# Patient Record
Sex: FEMALE | Race: WHITE | NOT HISPANIC OR LATINO | Employment: FULL TIME | URBAN - METROPOLITAN AREA
[De-identification: names, ages, dates, MRNs, and addresses within clinical notes are randomized per-mention and may not be internally consistent; named-entity substitution may affect disease eponyms.]

---

## 2017-05-30 ENCOUNTER — ALLSCRIPTS OFFICE VISIT (OUTPATIENT)
Dept: OTHER | Facility: OTHER | Age: 42
End: 2017-05-30

## 2017-12-01 ENCOUNTER — HOSPITAL ENCOUNTER (EMERGENCY)
Facility: HOSPITAL | Age: 42
Discharge: HOME | End: 2017-12-01
Attending: EMERGENCY MEDICINE | Admitting: EMERGENCY MEDICINE
Payer: COMMERCIAL

## 2017-12-01 VITALS
SYSTOLIC BLOOD PRESSURE: 132 MMHG | WEIGHT: 167.55 LBS | HEART RATE: 90 BPM | OXYGEN SATURATION: 100 % | RESPIRATION RATE: 18 BRPM | DIASTOLIC BLOOD PRESSURE: 81 MMHG

## 2017-12-01 DIAGNOSIS — B02.9 HERPES ZOSTER WITHOUT COMPLICATION: Primary | ICD-10-CM

## 2017-12-01 PROCEDURE — 99282 EMERGENCY DEPT VISIT SF MDM: CPT

## 2017-12-01 RX ORDER — ACYCLOVIR 200 MG/1
800 CAPSULE ORAL ONCE
Status: COMPLETED | OUTPATIENT
Start: 2017-12-01 | End: 2017-12-01

## 2017-12-01 RX ORDER — ACYCLOVIR 200 MG/1
800 CAPSULE ORAL
Qty: 140 CAPSULE | Refills: 0 | Status: SHIPPED | OUTPATIENT
Start: 2017-12-01 | End: 2017-12-08

## 2017-12-01 RX ADMIN — ACYCLOVIR 800 MG: 200 CAPSULE ORAL at 08:04

## 2017-12-01 NOTE — ED NOTES
Discharge instructions reviewed by provider  Pt states she has no questions at time of discharge  Ambulated independently out of department        Rosy Reyes RN  12/01/17 98

## 2017-12-01 NOTE — ED PROVIDER NOTES
History  Chief Complaint   Patient presents with    Rash     rash @r/ac area x5days; had uri last week     37 y/o female presents today c/o a rash in her right antecubital area which started 5 days ago  Describes it is more painful than itchy  No new detergents no new soaps no new medications  Was recently sick with a URI 2 weeks ago  No fevers  No known medical problems  Rash   Location:  Shoulder/arm  Shoulder/arm rash location:  R axilla  Quality: blistering and painful    Pain details:     Quality:  Shooting    Severity:  Moderate    Onset quality:  Gradual    Duration:  5 days    Timing:  Constant    Progression:  Worsening  Severity:  Moderate  Onset quality:  Gradual  Duration:  5 days  Progression:  Worsening  Chronicity:  New  Context: not chemical exposure, not exposure to similar rash, not insect bite/sting, not new detergent/soap and not sick contacts    Relieved by:  None tried  Worsened by:  Nothing  Ineffective treatments:  None tried  Associated symptoms: no abdominal pain, no diarrhea, no fatigue, no fever, no headaches, no hoarse voice, no induration, no joint pain, no myalgias, no nausea, no periorbital edema, no shortness of breath, no sore throat, no throat swelling, no tongue swelling, no URI, not vomiting and not wheezing        None       History reviewed  No pertinent past medical history  History reviewed  No pertinent surgical history  History reviewed  No pertinent family history  I have reviewed and agree with the history as documented  Social History   Substance Use Topics    Smoking status: Never Smoker    Smokeless tobacco: Never Used    Alcohol use No        Review of Systems   Constitutional: Negative for fatigue and fever  HENT: Negative for hoarse voice and sore throat  Respiratory: Negative for shortness of breath and wheezing  Gastrointestinal: Negative for abdominal pain, diarrhea, nausea and vomiting     Musculoskeletal: Negative for arthralgias and myalgias  Skin: Positive for rash  Neurological: Negative for headaches  Physical Exam  ED Triage Vitals [12/01/17 0715]   Temp Pulse Respirations Blood Pressure SpO2   -- 90 18 132/81 100 %      Temp src Heart Rate Source Patient Position - Orthostatic VS BP Location FiO2 (%)   -- -- Lying Left arm --      Pain Score       7           Orthostatic Vital Signs  Vitals:    12/01/17 0715   BP: 132/81   Pulse: 90   Patient Position - Orthostatic VS: Lying       Physical Exam   Constitutional: She is oriented to person, place, and time  She appears well-developed and well-nourished  Pulmonary/Chest: Effort normal    Neurological: She is alert and oriented to person, place, and time  Skin:   Vesicular rash in the right anticubital space with small satellite lesions up the right arm   Nursing note and vitals reviewed  ED Medications  Medications   acyclovir (ZOVIRAX) capsule 800 mg (not administered)       Diagnostic Studies  Results Reviewed     None                 No orders to display              Procedures  Procedures       Phone Contacts  ED Phone Contact    ED Course  ED Course                                MDM  Number of Diagnoses or Management Options  Herpes zoster without complication: new and does not require workup  Diagnosis management comments: 7:33 AM  Rash most likely consistent with zoster  Uncomplicated  Will treat with acyclovir  Recommend follow-up with PCP as an outpatient  Instructions for minimizing contact with immunocompromised individuals was enforced with patient, as well as people of never been infected with chickenpox      Risk of Complications, Morbidity, and/or Mortality  Presenting problems: low  Management options: low    Patient Progress  Patient progress: stable    CritCare Time    Disposition  Final diagnoses:   Herpes zoster without complication     Time reflects when diagnosis was documented in both MDM as applicable and the Disposition within this note     Time User Action Codes Description Comment    12/1/2017  7:26 AM So Alisonraf Galeas Add [B02 9] Herpes zoster without complication       ED Disposition     ED Disposition Condition Comment    Discharge  [address] discharge to home/self care  Condition at discharge: Stable        Follow-up Information     Follow up With Specialties Details Why Contact Dawit Rosales MD [facility] Medicine Schedule an appointment as soon as possible for a visit  [address] [facility] [address]  [phone]          Patient's Medications   Discharge Prescriptions    ACYCLOVIR (ZOVIRAX) 200 MG CAPSULE    Take 4 capsules by mouth 5 (five) times a day for 7 days       Start Date: 12/1/2017 End Date: 12/8/2017       Order Dose: 800 mg       Quantity: 140 capsule    Refills: 0     No discharge procedures on file      ED Provider  Electronically Signed by           Arlette Lugo DO  12/01/17 9303

## 2017-12-01 NOTE — DISCHARGE INSTRUCTIONS
Shingles   WHAT YOU NEED TO KNOW:   Shingles is a painful infection caused by the same virus that causes chickenpox (varicella-zoster virus)  After you get chickenpox, the virus stays in your body for several years without causing any symptoms  Shingles occurs when the virus becomes active again  Once active, the virus will travel along a nerve to your skin and cause a rash  DISCHARGE INSTRUCTIONS:   Return to the emergency department if:   · You have painful, red, warm skin around the blisters, or the blisters drain pus  · Your neck is stiff or you have trouble moving it  · You have trouble moving your arms, legs, or face  · You have a seizure  · You have weakness in an arm or leg  · You become confused, or have difficulty speaking  · You have dizziness, a severe headache, hearing or vision loss  Contact your healthcare provider if:   · You feel weak or have a headache  · You have a cough, chills, or a fever  · You have abdominal pain, nausea, or vomiting  · Your rash becomes more itchy or painful  · Your rash spreads to other parts of your body  · Your pain worsens and does not go away even after taking medicine  · You have questions or concerns about your condition or care  Medicines:   · Antiviral medicine  helps decrease symptoms and healing time  They may also decrease your risk of developing nerve pain  You will need to start taking them within 3 days of the start of symptoms to prevent nerve pain  · Pain medicine  may be prescribed or suggested by your healthcare provider  You may need NSAIDs, acetaminophen, or opioid medicine depending on how much pain you are in  Do not wait until the pain is severe before you take more pain medicine  · Topical anesthetics  are used to numb the skin and decrease pain  They can be a cream, gel, spray, or patch  · Anticonvulsants  decrease nerve pain and may help you sleep at night      · Antidepressants  may be used to decrease nerve pain  · Take your medicine as directed  Contact your healthcare provider if you think your medicine is not helping or if you have side effects  Tell him of her if you are allergic to any medicine  Keep a list of the medicines, vitamins, and herbs you take  Include the amounts, and when and why you take them  Bring the list or the pill bottles to follow-up visits  Carry your medicine list with you in case of an emergency  Follow up with your healthcare provider as directed:  Write down your questions so you remember to ask them during your visits  Self-care:  Keep your rash clean and dry  Cover your rash with a bandage or clothing  Do not use bandages that stick to your skin  The sticky part may irritate your skin and make your rash last longer  Prevent the spread of shingles: The virus can be passed to a person who has never had chickenpox  This person may get chickenpox, but not shingles  You may pass the virus to others as long as you have a rash  The virus is spread by direct contact with the fluid from the blisters  Usually, you cannot spread the virus once the blisters dry up  Prevent shingles or another shingles outbreak:  A vaccine may be given to help prevent shingles  Ask for more information about this vaccine  © 2017 [address] [employer] Information is for End User's use only and may not be sold, redistributed or otherwise used for commercial purposes  All illustrations and images included in CareNotes® are the copyrighted property of A [employer] M [employer]  or [employer]  The above information is an  only  It is not intended as medical advice for individual conditions or treatments  Talk to your doctor, nurse or pharmacist before following any medical regimen to see if it is safe and effective for you

## 2018-01-14 VITALS
HEIGHT: 64 IN | BODY MASS INDEX: 28.34 KG/M2 | SYSTOLIC BLOOD PRESSURE: 122 MMHG | WEIGHT: 166 LBS | DIASTOLIC BLOOD PRESSURE: 80 MMHG

## 2018-05-15 ENCOUNTER — ANNUAL EXAM (OUTPATIENT)
Dept: OBGYN CLINIC | Facility: CLINIC | Age: 43
End: 2018-05-15
Payer: COMMERCIAL

## 2018-05-15 VITALS
WEIGHT: 163 LBS | DIASTOLIC BLOOD PRESSURE: 78 MMHG | HEIGHT: 64 IN | BODY MASS INDEX: 27.83 KG/M2 | SYSTOLIC BLOOD PRESSURE: 116 MMHG

## 2018-05-15 DIAGNOSIS — Z12.39 BREAST CANCER SCREENING: ICD-10-CM

## 2018-05-15 DIAGNOSIS — Z01.419 ENCOUNTER FOR ANNUAL ROUTINE GYNECOLOGICAL EXAMINATION: Primary | ICD-10-CM

## 2018-05-15 PROCEDURE — S0612 ANNUAL GYNECOLOGICAL EXAMINA: HCPCS | Performed by: OBSTETRICS & GYNECOLOGY

## 2018-05-15 PROCEDURE — 87624 HPV HI-RISK TYP POOLED RSLT: CPT | Performed by: OBSTETRICS & GYNECOLOGY

## 2018-05-15 PROCEDURE — G0145 SCR C/V CYTO,THINLAYER,RESCR: HCPCS | Performed by: OBSTETRICS & GYNECOLOGY

## 2018-05-15 NOTE — PROGRESS NOTES
Assessment/Plan:    Pap smear done as well as annual   Encouraged self-breast examination as well as calcium supplementation  Recommend baseline mammogram   Return to office in 1 year    No problem-specific Assessment & Plan notes found for this encounter  Diagnoses and all orders for this visit:    Encounter for annual routine gynecological examination    Breast cancer screening  -     Mammo screening bilateral w cad; Future          Subjective:      Patient ID: Opal Farley is a 38 y o  female  HPI     This is a 82-year-old female  (age 17, 10,  x2) presents for annual gyn exam   She offers no complaints today  Her menstrual cycles are regular every 3 5-4 weeks lasting 4 days with no breakthrough bleeding  She denies any changes in bowel or bladder function  She is sexually active and has been in a monogamous relationship with her  over 18 years  They are using condoms for birth control  The following portions of the patient's history were reviewed and updated as appropriate: allergies, current medications, past family history, past medical history, past social history, past surgical history and problem list     Review of Systems   Constitutional: Negative for fatigue, fever and unexpected weight change  Respiratory: Negative for cough, chest tightness, shortness of breath and wheezing  Cardiovascular: Negative  Negative for chest pain and palpitations  Gastrointestinal: Negative  Negative for abdominal distention, abdominal pain, blood in stool, constipation, diarrhea, nausea and vomiting  Genitourinary: Negative  Negative for difficulty urinating, dyspareunia, dysuria, flank pain, frequency, genital sores, hematuria, pelvic pain, urgency, vaginal bleeding, vaginal discharge and vaginal pain  Skin: Negative for rash  Objective:      /78   Ht 5' 4" (1 626 m)   Wt 73 9 kg (163 lb)   LMP 2018 (Approximate)   Breastfeeding?  No   BMI 27 98 kg/m²          Physical Exam   Constitutional: She appears well-developed and well-nourished  Cardiovascular: Normal rate and regular rhythm  Pulmonary/Chest: Effort normal and breath sounds normal  Right breast exhibits no inverted nipple, no mass, no nipple discharge, no skin change and no tenderness  Left breast exhibits no inverted nipple, no mass, no nipple discharge, no skin change and no tenderness  Abdominal: Soft  Bowel sounds are normal  She exhibits no distension  There is no tenderness  There is no rebound and no guarding  Genitourinary: Vagina normal and uterus normal  There is no lesion on the right labia  There is no lesion on the left labia  Cervix exhibits no discharge and no friability  Right adnexum displays no mass, no tenderness and no fullness  Left adnexum displays no mass, no tenderness and no fullness  No vaginal discharge found

## 2018-05-17 LAB — HPV RRNA GENITAL QL NAA+PROBE: NORMAL

## 2018-05-18 LAB
LAB AP GYN PRIMARY INTERPRETATION: NORMAL
LAB AP LMP: NORMAL
Lab: NORMAL

## 2018-05-19 ENCOUNTER — HOSPITAL ENCOUNTER (OUTPATIENT)
Dept: MAMMOGRAPHY | Facility: HOSPITAL | Age: 43
Discharge: HOME | End: 2018-05-19
Payer: COMMERCIAL

## 2018-05-19 DIAGNOSIS — Z12.39 BREAST CANCER SCREENING: ICD-10-CM

## 2018-05-19 PROCEDURE — 77067 SCR MAMMO BI INCL CAD: CPT

## 2018-05-19 PROCEDURE — 77063 BREAST TOMOSYNTHESIS BI: CPT

## 2019-05-28 ENCOUNTER — ANNUAL EXAM (OUTPATIENT)
Dept: OBGYN CLINIC | Facility: CLINIC | Age: 44
End: 2019-05-28
Payer: COMMERCIAL

## 2019-05-28 VITALS
SYSTOLIC BLOOD PRESSURE: 118 MMHG | HEIGHT: 64 IN | DIASTOLIC BLOOD PRESSURE: 82 MMHG | WEIGHT: 160.4 LBS | BODY MASS INDEX: 27.39 KG/M2

## 2019-05-28 DIAGNOSIS — Z01.419 ENCOUNTER FOR ANNUAL ROUTINE GYNECOLOGICAL EXAMINATION: Primary | ICD-10-CM

## 2019-05-28 DIAGNOSIS — Z12.39 BREAST CANCER SCREENING: ICD-10-CM

## 2019-05-28 PROCEDURE — S0612 ANNUAL GYNECOLOGICAL EXAMINA: HCPCS | Performed by: OBSTETRICS & GYNECOLOGY

## 2019-08-03 ENCOUNTER — HOSPITAL ENCOUNTER (OUTPATIENT)
Dept: MAMMOGRAPHY | Facility: HOSPITAL | Age: 44
Discharge: HOME | End: 2019-08-03
Payer: COMMERCIAL

## 2019-08-03 VITALS — HEIGHT: 64 IN | WEIGHT: 160 LBS | BODY MASS INDEX: 27.31 KG/M2

## 2019-08-03 DIAGNOSIS — Z12.39 BREAST CANCER SCREENING: ICD-10-CM

## 2019-08-03 PROCEDURE — 77067 SCR MAMMO BI INCL CAD: CPT

## 2019-08-03 PROCEDURE — 77063 BREAST TOMOSYNTHESIS BI: CPT

## 2020-05-26 ENCOUNTER — ANNUAL EXAM (OUTPATIENT)
Dept: OBGYN CLINIC | Facility: CLINIC | Age: 45
End: 2020-05-26
Payer: COMMERCIAL

## 2020-05-26 VITALS
BODY MASS INDEX: 28.35 KG/M2 | HEIGHT: 63 IN | SYSTOLIC BLOOD PRESSURE: 118 MMHG | WEIGHT: 160 LBS | TEMPERATURE: 99.4 F | DIASTOLIC BLOOD PRESSURE: 78 MMHG

## 2020-05-26 DIAGNOSIS — Z01.419 ENCOUNTER FOR ANNUAL ROUTINE GYNECOLOGICAL EXAMINATION: Primary | ICD-10-CM

## 2020-05-26 DIAGNOSIS — Z12.39 BREAST CANCER SCREENING: ICD-10-CM

## 2020-05-26 PROCEDURE — S0612 ANNUAL GYNECOLOGICAL EXAMINA: HCPCS | Performed by: OBSTETRICS & GYNECOLOGY

## 2020-10-12 ENCOUNTER — HOSPITAL ENCOUNTER (OUTPATIENT)
Dept: MAMMOGRAPHY | Facility: HOSPITAL | Age: 45
Discharge: HOME | End: 2020-10-12
Payer: COMMERCIAL

## 2020-10-12 VITALS — BODY MASS INDEX: 28.35 KG/M2 | HEIGHT: 63 IN | WEIGHT: 160 LBS

## 2020-10-12 DIAGNOSIS — Z12.39 BREAST CANCER SCREENING: ICD-10-CM

## 2020-10-12 PROCEDURE — 77063 BREAST TOMOSYNTHESIS BI: CPT

## 2020-10-12 PROCEDURE — 77067 SCR MAMMO BI INCL CAD: CPT

## 2021-02-23 ENCOUNTER — TRANSCRIBE ORDERS (OUTPATIENT)
Dept: INTERNAL MEDICINE CLINIC | Facility: CLINIC | Age: 46
End: 2021-02-23

## 2021-02-23 DIAGNOSIS — Z20.828 CONTACT WITH AND (SUSPECTED) EXPOSURE TO OTHER VIRAL COMMUNICABLE DISEASES: Primary | ICD-10-CM

## 2021-02-23 DIAGNOSIS — Z20.828 CONTACT WITH AND (SUSPECTED) EXPOSURE TO OTHER VIRAL COMMUNICABLE DISEASES: ICD-10-CM

## 2021-02-23 PROCEDURE — U0005 INFEC AGEN DETEC AMPLI PROBE: HCPCS | Performed by: NEUROMUSCULOSKELETAL MEDICINE & OMM

## 2021-02-23 PROCEDURE — U0003 INFECTIOUS AGENT DETECTION BY NUCLEIC ACID (DNA OR RNA); SEVERE ACUTE RESPIRATORY SYNDROME CORONAVIRUS 2 (SARS-COV-2) (CORONAVIRUS DISEASE [COVID-19]), AMPLIFIED PROBE TECHNIQUE, MAKING USE OF HIGH THROUGHPUT TECHNOLOGIES AS DESCRIBED BY CMS-2020-01-R: HCPCS | Performed by: NEUROMUSCULOSKELETAL MEDICINE & OMM

## 2021-02-24 LAB — SARS-COV-2 RNA RESP QL NAA+PROBE: NEGATIVE

## 2021-03-16 DIAGNOSIS — Z20.828 EXPOSURE TO SARS-ASSOCIATED CORONAVIRUS: Primary | ICD-10-CM

## 2021-03-17 DIAGNOSIS — Z20.828 EXPOSURE TO SARS-ASSOCIATED CORONAVIRUS: ICD-10-CM

## 2021-03-17 PROCEDURE — U0003 INFECTIOUS AGENT DETECTION BY NUCLEIC ACID (DNA OR RNA); SEVERE ACUTE RESPIRATORY SYNDROME CORONAVIRUS 2 (SARS-COV-2) (CORONAVIRUS DISEASE [COVID-19]), AMPLIFIED PROBE TECHNIQUE, MAKING USE OF HIGH THROUGHPUT TECHNOLOGIES AS DESCRIBED BY CMS-2020-01-R: HCPCS | Performed by: PEDIATRICS

## 2021-03-17 PROCEDURE — U0005 INFEC AGEN DETEC AMPLI PROBE: HCPCS | Performed by: PEDIATRICS

## 2021-03-18 LAB — SARS-COV-2 RNA RESP QL NAA+PROBE: POSITIVE

## 2021-06-28 ENCOUNTER — ANNUAL EXAM (OUTPATIENT)
Dept: OBGYN CLINIC | Facility: CLINIC | Age: 46
End: 2021-06-28
Payer: COMMERCIAL

## 2021-06-28 VITALS
SYSTOLIC BLOOD PRESSURE: 120 MMHG | BODY MASS INDEX: 29.23 KG/M2 | WEIGHT: 165 LBS | DIASTOLIC BLOOD PRESSURE: 74 MMHG | HEIGHT: 63 IN

## 2021-06-28 DIAGNOSIS — Z12.31 ENCOUNTER FOR SCREENING MAMMOGRAM FOR BREAST CANCER: ICD-10-CM

## 2021-06-28 DIAGNOSIS — Z01.419 ENCOUNTER FOR ANNUAL ROUTINE GYNECOLOGICAL EXAMINATION: Primary | ICD-10-CM

## 2021-06-28 PROCEDURE — G0145 SCR C/V CYTO,THINLAYER,RESCR: HCPCS | Performed by: OBSTETRICS & GYNECOLOGY

## 2021-06-28 PROCEDURE — S0612 ANNUAL GYNECOLOGICAL EXAMINA: HCPCS | Performed by: OBSTETRICS & GYNECOLOGY

## 2021-06-28 NOTE — PROGRESS NOTES
Assessment/Plan:     Pap smear done for cytology, reflex HPV  Encouraged self-breast examination as well as calcium supplementation  Continue annual mammogram   Reviewed menstrual diary  All questions answered  Return to office in 1 year or p r n  No problem-specific Assessment & Plan notes found for this encounter  Diagnoses and all orders for this visit:    Encounter for annual routine gynecological examination  -     Liquid-based pap, screening    Encounter for screening mammogram for breast cancer  -     Mammo screening bilateral w 3d & cad; Future          Subjective:      Patient ID: Bhargav Mishra is a 39 y o  female  HPI      this is a very pleasant 26-year-old female  ( x2, age 14, 17) accompanied with her 44-year-old daughter presents for her annual gyn exam   She offers no complaints today  Her menstrual cycles are regular every 4 weeks lasting 5 days with no breakthrough bleeding  She denies any changes in bowel or bladder function  She has been in a monogamous relationship for over 19 years  Her Pap smears have been normal   She does follow up with her family physician on a regular basis  The following portions of the patient's history were reviewed and updated as appropriate: allergies, current medications, past family history, past medical history, past social history, past surgical history and problem list     Review of Systems   Constitutional: Negative for fatigue, fever and unexpected weight change  Respiratory: Negative for cough, chest tightness, shortness of breath and wheezing  Cardiovascular: Negative  Negative for chest pain and palpitations  Gastrointestinal: Negative  Negative for abdominal distention, abdominal pain, blood in stool, constipation, diarrhea, nausea and vomiting  Genitourinary: Negative    Negative for difficulty urinating, dyspareunia, dysuria, flank pain, frequency, genital sores, hematuria, pelvic pain, urgency, vaginal bleeding, vaginal discharge and vaginal pain  Skin: Negative for rash  Objective:      /74   Ht 5' 3" (1 6 m)   Wt 74 8 kg (165 lb)   LMP 06/09/2021   BMI 29 23 kg/m²          Physical Exam  Constitutional:       Appearance: Normal appearance  She is well-developed  Cardiovascular:      Rate and Rhythm: Normal rate and regular rhythm  Pulmonary:      Effort: Pulmonary effort is normal       Breath sounds: Normal breath sounds  Chest:      Breasts:         Right: No inverted nipple, mass, nipple discharge, skin change or tenderness  Left: No inverted nipple, mass, nipple discharge, skin change or tenderness  Abdominal:      General: Bowel sounds are normal  There is no distension  Palpations: Abdomen is soft  Tenderness: There is no abdominal tenderness  There is no guarding or rebound  Genitourinary:     Labia:         Right: No rash, tenderness or lesion  Left: No rash, tenderness or lesion  Vagina: Normal  No signs of injury  No vaginal discharge or tenderness  Cervix: No cervical motion tenderness, discharge, friability, lesion, erythema or cervical bleeding  Uterus: Not enlarged and not tender  Adnexa:         Right: No mass, tenderness or fullness  Left: No mass, tenderness or fullness  Skin:     General: Skin is dry  Neurological:      Mental Status: She is alert and oriented to person, place, and time

## 2021-07-01 LAB
LAB AP GYN PRIMARY INTERPRETATION: NORMAL
LAB AP LMP: NORMAL
Lab: NORMAL

## 2021-07-31 ENCOUNTER — IMMUNIZATIONS (OUTPATIENT)
Dept: FAMILY MEDICINE CLINIC | Facility: HOSPITAL | Age: 46
End: 2021-07-31

## 2021-07-31 DIAGNOSIS — Z23 ENCOUNTER FOR IMMUNIZATION: Primary | ICD-10-CM

## 2021-07-31 PROCEDURE — 91300 SARS-COV-2 / COVID-19 MRNA VACCINE (PFIZER-BIONTECH) 30 MCG: CPT

## 2021-07-31 PROCEDURE — 0001A SARS-COV-2 / COVID-19 MRNA VACCINE (PFIZER-BIONTECH) 30 MCG: CPT

## 2021-08-21 ENCOUNTER — IMMUNIZATIONS (OUTPATIENT)
Dept: FAMILY MEDICINE CLINIC | Facility: HOSPITAL | Age: 46
End: 2021-08-21

## 2021-08-21 DIAGNOSIS — Z23 ENCOUNTER FOR IMMUNIZATION: Primary | ICD-10-CM

## 2021-08-21 PROCEDURE — 91300 SARS-COV-2 / COVID-19 MRNA VACCINE (PFIZER-BIONTECH) 30 MCG: CPT

## 2021-08-21 PROCEDURE — 0002A SARS-COV-2 / COVID-19 MRNA VACCINE (PFIZER-BIONTECH) 30 MCG: CPT

## 2021-12-22 ENCOUNTER — HOSPITAL ENCOUNTER (OUTPATIENT)
Dept: MAMMOGRAPHY | Facility: HOSPITAL | Age: 46
Discharge: HOME | End: 2021-12-22
Payer: COMMERCIAL

## 2021-12-22 VITALS — WEIGHT: 164.9 LBS | HEIGHT: 63 IN | BODY MASS INDEX: 29.22 KG/M2

## 2021-12-22 DIAGNOSIS — Z12.31 ENCOUNTER FOR SCREENING MAMMOGRAM FOR BREAST CANCER: ICD-10-CM

## 2021-12-22 PROCEDURE — 77067 SCR MAMMO BI INCL CAD: CPT

## 2021-12-22 PROCEDURE — 77063 BREAST TOMOSYNTHESIS BI: CPT

## 2022-06-09 ENCOUNTER — OFFICE VISIT (OUTPATIENT)
Dept: OBGYN CLINIC | Facility: CLINIC | Age: 47
End: 2022-06-09
Payer: COMMERCIAL

## 2022-06-09 VITALS
DIASTOLIC BLOOD PRESSURE: 78 MMHG | WEIGHT: 162 LBS | HEIGHT: 63 IN | SYSTOLIC BLOOD PRESSURE: 118 MMHG | BODY MASS INDEX: 28.7 KG/M2

## 2022-06-09 DIAGNOSIS — Z12.31 ENCOUNTER FOR SCREENING MAMMOGRAM FOR BREAST CANCER: ICD-10-CM

## 2022-06-09 DIAGNOSIS — Z01.419 ENCOUNTER FOR ANNUAL ROUTINE GYNECOLOGICAL EXAMINATION: Primary | ICD-10-CM

## 2022-06-09 PROCEDURE — S0612 ANNUAL GYNECOLOGICAL EXAMINA: HCPCS | Performed by: OBSTETRICS & GYNECOLOGY

## 2022-06-09 NOTE — PROGRESS NOTES
Assessment/Plan:  Pap smear deferred due to low risk status  Encouraged self-breast examination as well as calcium supplementation  Continue annual mammogram   She will continue to follow-up with her primary care physician as scheduled  Return to office in 1 year or p r n  No problem-specific Assessment & Plan notes found for this encounter  Diagnoses and all orders for this visit:    Encounter for annual routine gynecological examination    Encounter for screening mammogram for breast cancer  -     Mammo screening bilateral w 3d & cad; Future          Subjective:      Patient ID: Ty Cross is a 42 y o  female  HPI     This is a pleasant 79-year-old female  ( x2, age 18, 17) accompanied with her 26-year-old daughter presents for her annual gyn exam   She states her menstrual cycles are regular approximately every 4 weeks lasting 4 days with no breakthrough bleeding  She denies any changes in bowel or bladder function  She has been in a monogamous relationship for over 20 years  Pap smears have been normal   She does follow up with her family physician on a regular basis  The following portions of the patient's history were reviewed and updated as appropriate: allergies, current medications, past family history, past medical history, past social history, past surgical history and problem list     Review of Systems   Constitutional: Negative for fatigue, fever and unexpected weight change  Respiratory: Negative for cough, chest tightness, shortness of breath and wheezing  Cardiovascular: Negative  Negative for chest pain and palpitations  Gastrointestinal: Negative  Negative for abdominal distention, abdominal pain, blood in stool, constipation, diarrhea, nausea and vomiting  Genitourinary: Negative    Negative for difficulty urinating, dyspareunia, dysuria, flank pain, frequency, genital sores, hematuria, pelvic pain, urgency, vaginal bleeding, vaginal discharge and vaginal pain  Skin: Negative for rash  Objective:      /78   Ht 5' 3" (1 6 m)   Wt 73 5 kg (162 lb)   LMP 05/21/2022   BMI 28 70 kg/m²          Physical Exam  Constitutional:       Appearance: Normal appearance  She is well-developed  Cardiovascular:      Rate and Rhythm: Normal rate and regular rhythm  Pulmonary:      Effort: Pulmonary effort is normal       Breath sounds: Normal breath sounds  Chest:   Breasts:      Right: No inverted nipple, mass, nipple discharge, skin change or tenderness  Left: No inverted nipple, mass, nipple discharge, skin change or tenderness  Abdominal:      General: Bowel sounds are normal  There is no distension  Palpations: Abdomen is soft  Tenderness: There is no abdominal tenderness  There is no guarding or rebound  Genitourinary:     Labia:         Right: No rash, tenderness or lesion  Left: No rash, tenderness or lesion  Vagina: Normal  No signs of injury  No vaginal discharge or tenderness  Cervix: No cervical motion tenderness, discharge, friability, lesion, erythema or cervical bleeding  Uterus: Not enlarged and not tender  Adnexa:         Right: No mass, tenderness or fullness  Left: No mass, tenderness or fullness  Neurological:      Mental Status: She is alert and oriented to person, place, and time     Psychiatric:         Behavior: Behavior normal

## 2023-01-07 ENCOUNTER — HOSPITAL ENCOUNTER (OUTPATIENT)
Dept: MAMMOGRAPHY | Facility: HOSPITAL | Age: 48
Discharge: HOME | End: 2023-01-07

## 2023-01-07 VITALS — BODY MASS INDEX: 28.71 KG/M2 | WEIGHT: 162.04 LBS | HEIGHT: 63 IN

## 2023-01-07 DIAGNOSIS — Z12.31 ENCOUNTER FOR SCREENING MAMMOGRAM FOR BREAST CANCER: ICD-10-CM

## 2023-12-21 ENCOUNTER — ANNUAL EXAM (OUTPATIENT)
Dept: OBGYN CLINIC | Facility: CLINIC | Age: 48
End: 2023-12-21
Payer: COMMERCIAL

## 2023-12-21 VITALS
WEIGHT: 170.4 LBS | SYSTOLIC BLOOD PRESSURE: 126 MMHG | HEIGHT: 63 IN | DIASTOLIC BLOOD PRESSURE: 86 MMHG | BODY MASS INDEX: 30.19 KG/M2

## 2023-12-21 DIAGNOSIS — Z12.31 ENCOUNTER FOR SCREENING MAMMOGRAM FOR BREAST CANCER: ICD-10-CM

## 2023-12-21 DIAGNOSIS — Z01.419 ENCOUNTER FOR ANNUAL ROUTINE GYNECOLOGICAL EXAMINATION: Primary | ICD-10-CM

## 2023-12-21 PROCEDURE — G0476 HPV COMBO ASSAY CA SCREEN: HCPCS | Performed by: OBSTETRICS & GYNECOLOGY

## 2023-12-21 PROCEDURE — G0145 SCR C/V CYTO,THINLAYER,RESCR: HCPCS | Performed by: OBSTETRICS & GYNECOLOGY

## 2023-12-21 PROCEDURE — S0612 ANNUAL GYNECOLOGICAL EXAMINA: HCPCS | Performed by: OBSTETRICS & GYNECOLOGY

## 2023-12-21 NOTE — PROGRESS NOTES
Assessment/Plan:  Pap smear done as well as annual.  Encouraged self breast examination as well as calcium supplementation.  Continue annual mammogram.  Reviewed colon cancer screening, all questions answered.  Reviewed signs and symptoms of perimenopause.  She will keep a menstrual diary.  We did discuss home pregnancy test.  All questions answered.  Return to office in 1 year or as needed  No problem-specific Assessment & Plan notes found for this encounter.       Diagnoses and all orders for this visit:    Encounter for annual routine gynecological examination    Encounter for screening mammogram for breast cancer  -     Mammo screening bilateral w 3d & cad; Future          Subjective:      Patient ID: Donna L Shankweiler is a 48 y.o. female.    HPI    This is a pleasant 48-year-old female P2 ( x 2, age 20, 18) presents for her GYN exam.  She states her cycles are regular every 4 weeks lasting 5 to 6 days with no breakthrough bleeding.  Her last menstrual cycle was .  She denies any hot flashes or night sweats.  No changes in bowel or bladder function.  She has been in a monogamous relationship with her  for over 21 years.  Method of contraception is  condoms.  This is the first time she is 3 weeks late for her cycle.  She did not do a home pregnancy test.    Mammogram 2023.    The following portions of the patient's history were reviewed and updated as appropriate: allergies, current medications, past family history, past medical history, past social history, past surgical history, and problem list.    Review of Systems   Constitutional:  Negative for fatigue, fever and unexpected weight change.   Respiratory:  Negative for cough, chest tightness, shortness of breath and wheezing.    Cardiovascular: Negative.  Negative for chest pain and palpitations.   Gastrointestinal: Negative.  Negative for abdominal distention, abdominal pain, blood in stool, constipation, diarrhea, nausea and vomiting.  "  Genitourinary: Negative.  Negative for difficulty urinating, dyspareunia, dysuria, flank pain, frequency, genital sores, hematuria, pelvic pain, urgency, vaginal bleeding, vaginal discharge and vaginal pain.   Skin:  Negative for rash.         Objective:      /86   Ht 5' 3\" (1.6 m)   Wt 77.3 kg (170 lb 6.4 oz)   LMP 11/06/2023 (Exact Date)   BMI 30.19 kg/m²          Physical Exam  Constitutional:       Appearance: Normal appearance. She is well-developed.   HENT:      Head: Normocephalic and atraumatic.   Cardiovascular:      Rate and Rhythm: Normal rate and regular rhythm.   Pulmonary:      Effort: Pulmonary effort is normal.      Breath sounds: Normal breath sounds.   Chest:   Breasts:     Right: No inverted nipple, mass, nipple discharge, skin change or tenderness.      Left: No inverted nipple, mass, nipple discharge, skin change or tenderness.   Abdominal:      General: Bowel sounds are normal. There is no distension.      Palpations: Abdomen is soft.      Tenderness: There is no abdominal tenderness. There is no guarding or rebound.   Genitourinary:     Labia:         Right: No rash, tenderness or lesion.         Left: No rash, tenderness or lesion.       Vagina: Normal. No signs of injury. No vaginal discharge or tenderness.      Cervix: No cervical motion tenderness, discharge, friability, lesion or cervical bleeding.      Uterus: Not enlarged and not tender.       Adnexa:         Right: No mass, tenderness or fullness.          Left: No mass, tenderness or fullness.     Neurological:      Mental Status: She is alert.   Psychiatric:         Behavior: Behavior normal.           "

## 2023-12-22 LAB
HPV HR 12 DNA CVX QL NAA+PROBE: NEGATIVE
HPV16 DNA CVX QL NAA+PROBE: NEGATIVE
HPV18 DNA CVX QL NAA+PROBE: NEGATIVE

## 2024-01-02 LAB
LAB AP GYN PRIMARY INTERPRETATION: NORMAL
Lab: NORMAL

## 2024-02-20 ENCOUNTER — HOSPITAL ENCOUNTER (OUTPATIENT)
Dept: MAMMOGRAPHY | Facility: HOSPITAL | Age: 49
Discharge: HOME | End: 2024-02-20
Payer: COMMERCIAL

## 2024-02-20 VITALS — HEIGHT: 63 IN | BODY MASS INDEX: 30.12 KG/M2 | WEIGHT: 170 LBS

## 2024-02-20 DIAGNOSIS — Z12.31 ENCOUNTER FOR SCREENING MAMMOGRAM FOR BREAST CANCER: ICD-10-CM

## 2024-02-20 PROCEDURE — 77067 SCR MAMMO BI INCL CAD: CPT

## 2024-02-20 PROCEDURE — 77063 BREAST TOMOSYNTHESIS BI: CPT

## 2025-02-03 ENCOUNTER — OFFICE VISIT (OUTPATIENT)
Dept: OBGYN CLINIC | Facility: CLINIC | Age: 50
End: 2025-02-03
Payer: COMMERCIAL

## 2025-02-03 VITALS
SYSTOLIC BLOOD PRESSURE: 134 MMHG | BODY MASS INDEX: 30.58 KG/M2 | DIASTOLIC BLOOD PRESSURE: 82 MMHG | WEIGHT: 172.6 LBS | HEIGHT: 63 IN

## 2025-02-03 DIAGNOSIS — N92.6 IRREGULAR MENSES: Primary | ICD-10-CM

## 2025-02-03 DIAGNOSIS — Z12.31 ENCOUNTER FOR SCREENING MAMMOGRAM FOR BREAST CANCER: ICD-10-CM

## 2025-02-03 PROCEDURE — 99213 OFFICE O/P EST LOW 20 MIN: CPT | Performed by: OBSTETRICS & GYNECOLOGY

## 2025-02-03 NOTE — PROGRESS NOTES
"Name: Donna L Shankweiler      : 1975      MRN: [id number]  Encounter Provider: Karin Manley DO  Encounter Date: 2/3/2025   Encounter department: [facility]  :  Assessment & Plan  Irregular menses         Encounter for screening mammogram for breast cancer    Orders:    Mammo screening bilateral w 3d and cad; Future    Reviewed menstrual diary.  Signs and symptoms of perimenopause reviewed.  All questions answered.  She will keep menstrual diary and call if cycle is frequent.  Otherwise return to office for annual visit.  She is scheduled for her mammogram next month.    History of Present Illness   HPI  Donna L Shankweiler is a 49 y.o. female who presents     This is a pleasant 49-year-old female P2 ( x 2, age 21, 19) presents complaining of irregular menses.  She states her cycles are regular every 4 weeks lasting 6 days up until 2024 and then subsequently had 3 weeks of continuous spotting.  She gets infrequent hot flashes.  She believes the women in her family have gone through menopause in their late 40s.  There were no changes in bowel or bladder function.    Pap 2023 normal        Review of Systems   Constitutional:  Negative for fatigue, fever and unexpected weight change.   Respiratory:  Negative for cough, chest tightness, shortness of breath and wheezing.    Cardiovascular: Negative.  Negative for chest pain and palpitations.   Gastrointestinal: Negative.  Negative for abdominal distention, abdominal pain, blood in stool, constipation, diarrhea, nausea and vomiting.   Genitourinary:  Positive for menstrual problem. Negative for difficulty urinating, dyspareunia, dysuria, flank pain, frequency, genital sores, hematuria, pelvic pain, urgency, vaginal bleeding, vaginal discharge and vaginal pain.   Skin:  Negative for rash.          Objective   /82   Ht 5' 3\" (1.6 m)   Wt 78.3 kg (172 lb 9.6 oz)   LMP 2025 (Approximate)   BMI 30.57 kg/m²      Physical " Exam  Genitourinary:     Labia:         Right: No rash, tenderness or lesion.         Left: No rash, tenderness or lesion.       Vagina: No signs of injury. No vaginal discharge or tenderness.      Cervix: No cervical motion tenderness, discharge, friability, lesion, erythema or cervical bleeding.      Uterus: Not enlarged and not tender.       Adnexa:         Right: No mass or tenderness.          Left: No mass or tenderness.           Administrative Statements   I have spent a total time of 20 minutes in caring for this patient on the day of the visit/encounter including Prognosis, Instructions for management, Impressions, Counseling / Coordination of care, Documenting in the medical record, Reviewing / ordering tests, medicine, procedures  , and Obtaining or reviewing history  .

## 2025-05-05 ENCOUNTER — APPOINTMENT (OUTPATIENT)
Dept: URGENT CARE | Facility: MEDICAL CENTER | Age: 50
End: 2025-05-05

## 2025-05-20 ENCOUNTER — HOSPITAL ENCOUNTER (OUTPATIENT)
Dept: MAMMOGRAPHY | Facility: HOSPITAL | Age: 50
Discharge: HOME | End: 2025-05-20
Payer: COMMERCIAL

## 2025-05-20 VITALS — HEIGHT: 63 IN | BODY MASS INDEX: 30.59 KG/M2 | WEIGHT: 172.62 LBS

## 2025-05-20 DIAGNOSIS — Z12.31 ENCOUNTER FOR SCREENING MAMMOGRAM FOR BREAST CANCER: ICD-10-CM

## 2025-05-20 PROCEDURE — 77067 SCR MAMMO BI INCL CAD: CPT

## 2025-05-20 PROCEDURE — 77063 BREAST TOMOSYNTHESIS BI: CPT

## 2025-07-09 ENCOUNTER — OFFICE VISIT (OUTPATIENT)
Dept: PAIN MEDICINE | Facility: CLINIC | Age: 50
End: 2025-07-09
Payer: COMMERCIAL

## 2025-07-09 ENCOUNTER — HOSPITAL ENCOUNTER (OUTPATIENT)
Dept: RADIOLOGY | Facility: HOSPITAL | Age: 50
Discharge: HOME | End: 2025-07-09
Payer: COMMERCIAL

## 2025-07-09 VITALS
WEIGHT: 174 LBS | HEIGHT: 63 IN | SYSTOLIC BLOOD PRESSURE: 140 MMHG | BODY MASS INDEX: 30.83 KG/M2 | DIASTOLIC BLOOD PRESSURE: 99 MMHG | HEART RATE: 88 BPM

## 2025-07-09 DIAGNOSIS — G89.29 CHRONIC PAIN OF LEFT KNEE: ICD-10-CM

## 2025-07-09 DIAGNOSIS — M25.562 CHRONIC PAIN OF LEFT KNEE: Primary | ICD-10-CM

## 2025-07-09 DIAGNOSIS — M70.61 GREATER TROCHANTERIC BURSITIS OF RIGHT HIP: ICD-10-CM

## 2025-07-09 DIAGNOSIS — M25.562 CHRONIC PAIN OF LEFT KNEE: ICD-10-CM

## 2025-07-09 DIAGNOSIS — G89.29 CHRONIC RIGHT HIP PAIN: ICD-10-CM

## 2025-07-09 DIAGNOSIS — M25.551 CHRONIC RIGHT HIP PAIN: ICD-10-CM

## 2025-07-09 DIAGNOSIS — G89.29 CHRONIC PAIN OF LEFT KNEE: Primary | ICD-10-CM

## 2025-07-09 PROCEDURE — 73502 X-RAY EXAM HIP UNI 2-3 VIEWS: CPT

## 2025-07-09 PROCEDURE — 72110 X-RAY EXAM L-2 SPINE 4/>VWS: CPT

## 2025-07-09 PROCEDURE — 73562 X-RAY EXAM OF KNEE 3: CPT

## 2025-07-09 PROCEDURE — 99204 OFFICE O/P NEW MOD 45 MIN: CPT | Performed by: ANESTHESIOLOGY

## 2025-07-09 NOTE — PROGRESS NOTES
Name: Donna L Shankweiler      : 1975      MRN: [id number]  Encounter Provider: Edison Tam MD  Encounter Date: 2025   Encounter department: [facility]  :  Assessment & Plan  Chronic pain of left knee  Chronic right hip pain  Greater trochanteric bursitis of right hip    Orders:  •  XR knee 3 vw left non injury; Future  •  XR hip/pelv 2-3 vws right if performed; Future  •  XR Spine Lumbar 4-5 views Non Injury; Future    The patient's symptoms, history/physical are consistent with likely osteoarthritis of the left knee and trochanteric bursitis of the hip.  At this time I will order x-rays of the hip/pelvis, left knee and lumbar spine to evaluate.  I advised we will call with the results and discuss treatment moving forward.      History of Present Illness     Donna L Shankweiler is a 49 y.o. female who presents for consultation regards to pain in her right hip as well as left knee and right leg.  Symptoms have been present for several years reported as moderate rated 6/10 on a numeric rating scale that is felt nearly constantly.  Symptoms can be dull/aching and cramping.  She feels weakness of the right leg.  Symptoms are aggravated by physical activity as well as with bending, sitting and climbing stairs especially with the left knee.    Review of Systems   Constitutional:  Negative for fever and unexpected weight change.   HENT:  Negative for trouble swallowing.    Eyes:  Negative for visual disturbance.   Respiratory:  Negative for shortness of breath and wheezing.    Cardiovascular:  Negative for chest pain and palpitations.   Gastrointestinal:  Negative for constipation, diarrhea, nausea and vomiting.   Endocrine: Negative for cold intolerance, heat intolerance and polydipsia.   Genitourinary:  Negative for difficulty urinating and frequency.   Musculoskeletal:  Positive for back pain (right side). Negative for arthralgias, gait problem, joint swelling and myalgias.      "   Right hip pain  Left knee pain   Skin:  Negative for rash.   Neurological:  Negative for dizziness, seizures, syncope, weakness and headaches.   Hematological:  Does not bruise/bleed easily.   Psychiatric/Behavioral:  Negative for dysphoric mood.    All other systems reviewed and are negative.    Medical History Reviewed by provider this encounter:  Tobacco  Allergies  Meds  Problems  Med Hx  Surg Hx  Fam Hx     .  Medications Ordered Prior to Encounter[1]   Social History[2]     Objective   /99 (BP Location: Right arm, Patient Position: Sitting, Cuff Size: Standard)   Pulse 88   Ht 5' 3\" (1.6 m)   Wt 78.9 kg (174 lb)   BMI 30.82 kg/m²      Pain Score:   6  Physical Exam  Constitutional: normal, well developed, well nourished, alert, in no distress and non-toxic and no overt pain behavior.  Eyes: anicteric  HEENT: grossly intact  Neck: supple, symmetric, trachea midline and no masses   Pulmonary: even and unlabored  Cardiovascular: No edema or pitting edema present  Skin: Normal without rashes or lesions and well hydrated  Psychiatric: Mood and affect appropriate  Neurologic: Cranial Nerves II-XII grossly intact  Musculoskeletal: Examination of the left knee reveals no pain with palpation, full range of motion and negative valgus and varus stressing    Lumbar Spine Exam  Appearance:  Normal lordosis  Palpation/Tenderness:  Right trochanteric bursa tenderness  Range of Motion:  Flexion:  No limitation  without pain  Extension:  No limitation  without pain  Motor Strength:  Left hip flexion:  5/5  Left hip extension:  5/5  Right hip flexion:  5/5  Right hip extension:  5/5  Left knee flexion:  5/5  Left knee extension:  5/5  Right knee flexion:  5/5  Right knee extension:  5/5  Left foot dorsiflexion:  5/5  Left foot plantar flexion:  5/5  Right foot dorsiflexion:  5/5  Right foot plantar flexion:  5/5  Reflexes:  Left Patellar:  2+   Right Patellar:  2+   Left Achilles:  2+   Right Achilles:  2+ "              [1]  Current Outpatient Medications on File Prior to Visit   Medication Sig Dispense Refill   • acyclovir (ZOVIRAX) 200 mg capsule Take 4 capsules by mouth 5 (five) times a day for 7 days (Patient not taking: Reported on 6/28/2021) 140 capsule 0     No current facility-administered medications on file prior to visit.   [2]  Social History  Tobacco Use   • Smoking status: Never   • Smokeless tobacco: Never   • Tobacco comments:     former smoker per [employer]    Vaping Use   • Vaping status: Never Used   Substance and Sexual Activity   • Alcohol use: Not Currently     Comment: social   • Drug use: No   • Sexual activity: Yes     Partners: Male     Birth control/protection: Condom Male

## 2025-07-10 ENCOUNTER — TELEPHONE (OUTPATIENT)
Dept: RADIOLOGY | Facility: CLINIC | Age: 50
End: 2025-07-10

## 2025-07-10 DIAGNOSIS — G89.4 CHRONIC PAIN SYNDROME: Primary | ICD-10-CM

## 2025-07-10 NOTE — TELEPHONE ENCOUNTER
Please let patient know x-ray of the left knee showed some degeneration.  X-rays of the right hip were normal.  X-rays of the lumbar spine showed some mild arthritis and some very mild scoliosis.    Would recommend starting anti-inflammatory to help with symptoms.  If amenable, will send prescription for meloxicam 7.5 mg daily to help with pain.

## 2025-07-11 NOTE — TELEPHONE ENCOUNTER
Caller: Patient    Doctor: Dr. Tam    Reason for call: patient returning call to nurse    Call back#: [phone]

## 2025-07-11 NOTE — TELEPHONE ENCOUNTER
Caller: Lety    Doctor/Office: Dr GUERRA    Call regarding :  returning call from nurse     Call was transferred to: nurse

## 2025-07-11 NOTE — TELEPHONE ENCOUNTER
Attempted to contact Pt. [facility]. Provided with CB# and OH.

## 2025-07-11 NOTE — TELEPHONE ENCOUNTER
S/w Pt, advised of the same. Pt verbalized understanding. Pt agreeable to try meloxicam medication, please send to [facility] pharmacy on file.

## 2025-07-14 RX ORDER — MELOXICAM 7.5 MG/1
7.5 TABLET ORAL DAILY
Qty: 30 TABLET | Refills: 2 | Status: SHIPPED | OUTPATIENT
Start: 2025-07-14

## 2025-08-13 ENCOUNTER — ANNUAL EXAM (OUTPATIENT)
Dept: OBGYN CLINIC | Facility: CLINIC | Age: 50
End: 2025-08-13
Payer: COMMERCIAL

## 2025-08-13 PROCEDURE — G0145 SCR C/V CYTO,THINLAYER,RESCR: HCPCS | Performed by: OBSTETRICS & GYNECOLOGY
